# Patient Record
Sex: MALE | Race: WHITE
[De-identification: names, ages, dates, MRNs, and addresses within clinical notes are randomized per-mention and may not be internally consistent; named-entity substitution may affect disease eponyms.]

---

## 2018-07-25 ENCOUNTER — HOSPITAL ENCOUNTER (OUTPATIENT)
Dept: HOSPITAL 61 - KCIC MRI | Age: 73
Discharge: HOME | End: 2018-07-25
Attending: PHYSICIAN ASSISTANT
Payer: MEDICARE
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
## 2018-07-25 DIAGNOSIS — S83.241A: Primary | ICD-10-CM

## 2018-07-25 DIAGNOSIS — M22.41: ICD-10-CM
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
## 2018-07-25 DIAGNOSIS — M17.11: ICD-10-CM

## 2018-07-25 PROCEDURE — 73721 MRI JNT OF LWR EXTRE W/O DYE: CPT

## 2018-12-21 ENCOUNTER — HOSPITAL ENCOUNTER (OUTPATIENT)
Dept: HOSPITAL 61 - KCIC CT | Age: 73
Discharge: HOME | End: 2018-12-21
Attending: FAMILY MEDICINE
Payer: MEDICARE

## 2018-12-21 DIAGNOSIS — C91.10: Primary | ICD-10-CM

## 2018-12-21 DIAGNOSIS — R91.1: ICD-10-CM

## 2018-12-21 DIAGNOSIS — K44.9: ICD-10-CM

## 2018-12-21 DIAGNOSIS — N20.0: ICD-10-CM

## 2018-12-21 DIAGNOSIS — I25.10: ICD-10-CM

## 2018-12-21 DIAGNOSIS — Z85.038: ICD-10-CM

## 2018-12-21 PROCEDURE — 74160 CT ABDOMEN W/CONTRAST: CPT

## 2018-12-21 PROCEDURE — 82565 ASSAY OF CREATININE: CPT

## 2018-12-21 PROCEDURE — 71260 CT THORAX DX C+: CPT

## 2018-12-21 NOTE — [facility]
EXAM: CT Chest and Abdomen with IV contrast

 

CLINICAL HISTORY: Left sided chest pain. History of chronic lymphoid 

leukemia and colon cancer. 

 

COMPARISON: None.

 

TECHNIQUE: Helical CT of the chest, abdomen and pelvis was performed 

following the administration of intravenous contrast. Oral contrast was 

administered. Axial, coronal and sagittal reformatted images were 

generated.

 

---PQRS compliance statement - One or more of the following individualized

dose reduction techniques were utilized for this study:

1.  Automated exposure control

2.  Adjustment of the mA and/or kV according to patient size

3.  Use of iterative reconstruction technique---

 

FINDINGS: 

 

Chest: 

The heart is not enlarged. No pericardial effusion. Coronary artery 

calcifications are seen.

 

No pleural effusion or pneumothorax.

 

No mediastinal or hilar lymphadenopathy by size criteria. No axillary 

lymphadenopathy. Small hiatal hernia.

 

Minimal dependent opacities in the left lower lobe and medial right lower 

lobe likely scarring/atelectasis. A 5 mm nodular opacity is seen in the 

right lower lobe (series [id number], image 33). No lobar consolidation.

 

Abdomen: 

No focal liver lesion. Gallbladder is normal. No biliary ductal 

dilatation. Spleen is unremarkable.

 

Adrenal glands and pancreas are normal.

 

Symmetric nephrograms. There are 2 nonobstructing left lower pole and one 

right lower pole renal calculi. Multiple bilateral hypodense renal lesions

are too small to characterize, likely cysts. A 6.7 cm left lower pole 

cystic lesion is seen.

 

No abdominal lymphadenopathy or ascites.

 

Bones: 

Multilevel degenerative changes of the spine are seen.

 

IMPRESSION:

1.  5 mm right lower lobe lung nodule is seen. Per Fleischner Society 

guidelines for incidentally found solid nodules measuring less than 6 mm, 

CT follow-up in about 12 months can be considered given history of 

malignancy. 

2.  No thoracic or upper abdominal lymphadenopathy.

3.  Bilateral nonobstructing renal calculi.

 

Electronically signed by: Armando Cardozo MD (12/21/2018 12:45 PM) [facility]

## 2019-06-26 ENCOUNTER — HOSPITAL ENCOUNTER (OUTPATIENT)
Dept: HOSPITAL 61 - KCIC CT | Age: 74
Discharge: HOME | End: 2019-06-26
Attending: FAMILY MEDICINE
Payer: MEDICARE

## 2019-06-26 DIAGNOSIS — R91.1: ICD-10-CM

## 2019-06-26 DIAGNOSIS — J98.4: ICD-10-CM

## 2019-06-26 DIAGNOSIS — I10: ICD-10-CM

## 2019-06-26 DIAGNOSIS — Z79.01: ICD-10-CM

## 2019-06-26 DIAGNOSIS — I25.10: Primary | ICD-10-CM

## 2019-06-26 DIAGNOSIS — K44.9: ICD-10-CM

## 2019-06-26 PROCEDURE — 71250 CT THORAX DX C-: CPT

## 2019-06-27 NOTE — [facility]
Examination: CT chest without contrast

 

HISTORY: History of lung nodule follow-up

 

COMPARISON: 12/20/2018

 

TECHNIQUE: Axial CT images of chest were performed without contrast. 

Coronal sagittal reformats are performed

 

Exposure: One or more of the following individualized dose reduction 

techniques were utilized for this examination:  1. Automated exposure 

control  2. Adjustment of the mA and/or kV according to patient size  3. 

Use of iterative reconstruction technique

 

FINDINGS:

 

The central airways are patent. Mild coronary artery calcifications. Small

hiatal hernia. No radiologically significant mediastinal lymphadenopathy 

is identified. The previously visualized 5 mm right lower lobe pulmonary 

nodule is similar to prior exam. Minimal scarring changes identified in 

the medial aspect of the right lower lobe of the lung grossly similar to 

prior exam. No evidence of pleural effusion or pneumothorax. The 

visualized liver, spleen, adrenals grossly appears unremarkable

 

Partially visualized exophytic density measuring 9 mm in the right kidney 

is difficult to characterize similar to prior exam.

 

 

IMPRESSION:

 

1. 5 mm pulmonary nodule identified in the right lower lobe lung similar 

to prior exam.

 

2. Coronary artery calcifications.

 

 

 

 

 

Electronically signed by: Helio Bolanos MD (6/27/2019 2:07 PM) [facility]

## 2019-12-12 ENCOUNTER — HOSPITAL ENCOUNTER (OUTPATIENT)
Dept: HOSPITAL 61 - KCIC US | Age: 74
Discharge: HOME | End: 2019-12-12
Attending: FAMILY MEDICINE
Payer: MEDICARE

## 2019-12-12 DIAGNOSIS — N40.0: ICD-10-CM

## 2019-12-12 DIAGNOSIS — N32.89: ICD-10-CM

## 2019-12-12 DIAGNOSIS — N28.1: Primary | ICD-10-CM

## 2019-12-12 PROCEDURE — 76770 US EXAM ABDO BACK WALL COMP: CPT

## 2019-12-12 NOTE — [facility]
Bilateral renal ultrasound without comparison for abnormal CT, bilateral 

kidney cysts.

 

Technique an findings: Real-time grayscale and color Doppler evaluation of

the kidneys and urinary bladder is performed. The right kidney measures 

11.8 x 5.5 x 4.8 cm and the left measures 12.8 x 5.4 x 5.9 cm. There is no

hydronephrosis involving either kidney. The proximal aorta is obscured, 

however the mid and distal aorta are identified and are nonaneurysmal. The

IVC is obscured. The urinary bladder is fluid distended and bilateral 

ureteral jets are identified. There is prostatic hypertrophy, with a 

prostate volume estimated at 152 cubic cm. On the right kidney, there is a

very small 1.1 cm exophytic heterogeneously hypoechoic abnormality which 

does have some increased through transmission but also contains internal 

echoes. This likely represent complex cyst with proteinaceous or 

hemorrhagic debris. At the inferior pole the left kidney arising in 

exophytic fashion is a large simple cyst measuring 7.6 cm. There is normal

color flow to both kidneys.

 

IMPRESSION:

1. Small complex exophytic cyst arising from the right kidney, Bosniak 

category 2F. Recommend 6-12 month follow-up to establish stability.

 

Electronically signed by: Bill Dalal MD (12/12/2019 4:41 PM) [facility]

## 2021-12-21 ENCOUNTER — HOSPITAL ENCOUNTER (OUTPATIENT)
Dept: HOSPITAL 61 - KCIC MRI | Age: 76
End: 2021-12-21
Attending: FAMILY MEDICINE
Payer: MEDICARE

## 2021-12-21 DIAGNOSIS — M19.011: ICD-10-CM
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
## 2021-12-21 DIAGNOSIS — S46.111A: Primary | ICD-10-CM

## 2021-12-21 DIAGNOSIS — M75.51: ICD-10-CM
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
## 2021-12-21 PROCEDURE — 73221 MRI JOINT UPR EXTREM W/O DYE: CPT

## 2021-12-21 NOTE — [facility]
STUDY: MRI of the right shoulder without contrast



INDICATION: Right shoulder and arm pain. Limited range of motion. 



COMPARISON: None. 



TECHNIQUE: Multiplanar MR imaging of the right shoulder performed without the use of intravenous or i
ntra-articular contrast.  



FINDINGS:



AC joint: Moderate/severe arthrosis with articular surface remodeling and fluid signal within the tyson
nt space. Moderate subacromial subdeltoid bursitis. 



Rotator cuff: Hypertrophic and disorganized supraspinatus at the leading edge. Full-thickness perfora
ting-type tear at/adjacent to the footprint as seen on images 8 and 9 series 6 and images 10 and 11 s
eries 3. A few additional small areas of low grade interstitial tearing at the supraspinatus footprin
t more posteriorly involving less than 25% tendon thickness. Infraspinatus tendinosis without a high-
grade or full-thickness tear. Intact teres minor and subscapularis. Rotator cuff muscular bulk is wit
hin normal limits.



Labrum: Mild signal heterogeneity from superior to posterior/superior without a well delineated tear.
 



Long head biceps tendon: Tendinosis and partial tearing of both the intra-articular and extra articul
ar portions. 



Cartilage: No more than partial thickness chondrosis. 



Bones: Glenohumeral arthrosis is mild. Chronic changes at the greater tuberosity relating to insertio
nal tendinopathy. 



Miscellaneous: Minimal joint fluid. Unremarkable axillary pouch. Subcoracoid bursitis communicating w
ith the subacromial subdeltoid bursa. 



Impression:



1.  Hypertrophic supraspinatus tendinosis mainly at the more anterior aspect. Near the leading edge, 
disorganized tendon fibers with a full-thickness perforating-type tear at this location (image 8 seri
es 6). No high-grade or full-thickness tear elsewhere. Infraspinatus tendinosis but less so than the 
supraspinatus Muscular bulk is maintained.

2.  Long head biceps tendinosis and partial-thickness tearing both of the intra-articular and extra a
rticular portions. No well delineated labral tear.

3.  Moderate subacromial subdeltoid bursitis which communicates with the subcoracoid bursa. Moderate 
to severe AC joint arthrosis.



Electronically signed by: FABIÁN SPARROW MD (12/21/2021 3:37 PM) [id number]